# Patient Record
Sex: MALE | Race: WHITE | Employment: STUDENT | URBAN - METROPOLITAN AREA
[De-identification: names, ages, dates, MRNs, and addresses within clinical notes are randomized per-mention and may not be internally consistent; named-entity substitution may affect disease eponyms.]

---
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
## 2022-02-24 ENCOUNTER — APPOINTMENT (OUTPATIENT)
Dept: GENERAL RADIOLOGY | Age: 22
End: 2022-02-24
Attending: NURSE PRACTITIONER
Payer: COMMERCIAL

## 2022-02-24 ENCOUNTER — HOSPITAL ENCOUNTER (OUTPATIENT)
Age: 22
Discharge: HOME | End: 2022-02-24
Payer: COMMERCIAL

## 2022-02-24 VITALS
HEIGHT: 69 IN | BODY MASS INDEX: 29.62 KG/M2 | DIASTOLIC BLOOD PRESSURE: 70 MMHG | RESPIRATION RATE: 16 BRPM | WEIGHT: 200 LBS | HEART RATE: 54 BPM | TEMPERATURE: 98 F | OXYGEN SATURATION: 97 % | SYSTOLIC BLOOD PRESSURE: 151 MMHG

## 2022-02-24 DIAGNOSIS — J34.89 NOSE PAIN: Primary | ICD-10-CM

## 2022-02-24 DIAGNOSIS — S00.33XA HEMATOMA OF NASAL SEPTUM, INITIAL ENCOUNTER: ICD-10-CM

## 2022-02-24 PROCEDURE — 70160 X-RAY EXAM OF NASAL BONES: CPT | Performed by: NURSE PRACTITIONER

## 2022-02-24 PROCEDURE — 99203 OFFICE O/P NEW LOW 30 MIN: CPT | Performed by: NURSE PRACTITIONER

## 2022-02-24 RX ORDER — FLUTICASONE PROPIONATE 50 MCG
1 SPRAY, SUSPENSION (ML) NASAL 2 TIMES DAILY
COMMUNITY
Start: 2022-02-11

## 2022-02-24 NOTE — ED INITIAL ASSESSMENT (HPI)
Patient was hit 4 weeks ago in the nose while boxing with boxing gloves. +epistaxis  Patient has had nassal stuffiness L>R.  Patient's doctor sent him to the [facility] for a possible deviated septum

## 2022-03-01 ENCOUNTER — OFFICE VISIT (OUTPATIENT)
Dept: OTOLARYNGOLOGY | Facility: CLINIC | Age: 22
End: 2022-03-01
Payer: COMMERCIAL

## 2022-03-01 VITALS — WEIGHT: 200 LBS | HEIGHT: 69 IN | BODY MASS INDEX: 29.62 KG/M2

## 2022-03-01 DIAGNOSIS — J34.2 NASAL SEPTAL DEVIATION: ICD-10-CM

## 2022-03-01 DIAGNOSIS — R04.0 EPISTAXIS: Primary | ICD-10-CM

## 2022-03-01 PROCEDURE — 3008F BODY MASS INDEX DOCD: CPT | Performed by: SPECIALIST

## 2022-03-01 PROCEDURE — 99213 OFFICE O/P EST LOW 20 MIN: CPT | Performed by: SPECIALIST

## 2022-03-01 PROCEDURE — 30901 CONTROL OF NOSEBLEED: CPT | Performed by: SPECIALIST

## 2022-03-01 NOTE — PATIENT INSTRUCTIONS
You have a left bony and cartilaginous septal deviation. You have bleeding off the anterior portion of the right inferior turbinate. This was cauterized. No boxing until  March 14. Follow-up with any further problems. No nose blowing or Flonase for 1 week's time.

## 2022-03-02 ENCOUNTER — TELEPHONE (OUTPATIENT)
Dept: OTOLARYNGOLOGY | Facility: CLINIC | Age: 22
End: 2022-03-02

## 2022-03-02 NOTE — TELEPHONE ENCOUNTER
Baron Martínez pt asking to speak with you directly, first pt wanting to know what surgery you recommend and why, pt does not know why his nose was cauterized yesterday. Also per pt asking if he can use nasal rinse with just water?  Please advise

## 2022-03-02 NOTE — TELEPHONE ENCOUNTER
Nose was cauterized as bleeding. Can use nasal saline. Surgery was septoplasty. 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

## 2022-03-02 NOTE — TELEPHONE ENCOUNTER
Pt called stating pt had an appointment yesterday 3-1-22. Advised not to use the nasal spray and not to blow the nose. Can pt wash the nose. Pt found something to place in the nose to breath better.   Please call

## 2022-07-06 ENCOUNTER — TELEPHONE (OUTPATIENT)
Dept: OTOLARYNGOLOGY | Facility: CLINIC | Age: 22
End: 2022-07-06

## 2022-07-06 NOTE — TELEPHONE ENCOUNTER
Vincent from physician billing states patient is requesting the clinical codes. States he got a bill and he denies what was coded. States its for a nose bleed and he was not treated for that.  Please advise

## 2022-07-07 NOTE — TELEPHONE ENCOUNTER
Patient has one office visit from 3/2/22. Diagnoses were epistaxis R04.0 and deviated septum J34.2. Note indicates patient had nose bleed from a boxing injury and it was cauterized. Exam of the nose revealed a septal deviation to the left. No other diagnoses provided. Left message to call back.

## 2022-07-25 NOTE — TELEPHONE ENCOUNTER
I spoke with patient. He states he did not have an issue with nosebleed at his 3/1/22 visit. States he had difficulty breathing through his nose. He states he was not told what procedure was occurring and that he did not consent to it. I advised him that any treatment rendered that day was based upon Dr. Man Breath assessment at that time. His nose would not be cauterized if it was not medically necessary. He verbalized understanding but wishes this message be forwarded to Dr. Prince Lyons for review.

## (undated) NOTE — ED AVS SNAPSHOT
Emily Contreras   MRN: [id number]    Department:  [facility] Emergency Department   Date of Visit:  12/30/2019           Disclosure     Insurance plans vary and the physician(s) referred by the ER may not be covered by your plan.  Please contact y CARE PHYSICIAN AT ONCE OR RETURN IMMEDIATELY TO THE EMERGENCY DEPARTMENT. If you have been prescribed any medication(s), please fill your prescription right away and begin taking the medication(s) as directed.   If you believe that any of the medications

## (undated) NOTE — LETTER
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       Sincerely,    Papi Ramirez MD